# Patient Record
Sex: MALE | Race: WHITE
[De-identification: names, ages, dates, MRNs, and addresses within clinical notes are randomized per-mention and may not be internally consistent; named-entity substitution may affect disease eponyms.]

---

## 2018-06-23 ENCOUNTER — HOSPITAL ENCOUNTER (INPATIENT)
Dept: HOSPITAL 62 - ER | Age: 73
LOS: 2 days | Discharge: HOME | DRG: 203 | End: 2018-06-25
Attending: INTERNAL MEDICINE | Admitting: INTERNAL MEDICINE
Payer: MEDICARE

## 2018-06-23 DIAGNOSIS — R09.02: ICD-10-CM

## 2018-06-23 DIAGNOSIS — E78.00: ICD-10-CM

## 2018-06-23 DIAGNOSIS — Z79.01: ICD-10-CM

## 2018-06-23 DIAGNOSIS — I48.91: ICD-10-CM

## 2018-06-23 DIAGNOSIS — I10: ICD-10-CM

## 2018-06-23 DIAGNOSIS — J45.21: Primary | ICD-10-CM

## 2018-06-23 LAB
ADD MANUAL DIFF: NO
ANION GAP SERPL CALC-SCNC: 12 MMOL/L (ref 5–19)
BASOPHILS # BLD AUTO: 0.1 10^3/UL (ref 0–0.2)
BASOPHILS NFR BLD AUTO: 0.6 % (ref 0–2)
BUN SERPL-MCNC: 15 MG/DL (ref 7–20)
CALCIUM: 10 MG/DL (ref 8.4–10.2)
CHLORIDE SERPL-SCNC: 106 MMOL/L (ref 98–107)
CO2 SERPL-SCNC: 25 MMOL/L (ref 22–30)
EOSINOPHIL # BLD AUTO: 0.6 10^3/UL (ref 0–0.6)
EOSINOPHIL NFR BLD AUTO: 5.6 % (ref 0–6)
ERYTHROCYTE [DISTWIDTH] IN BLOOD BY AUTOMATED COUNT: 14.7 % (ref 11.5–14)
GLUCOSE SERPL-MCNC: 116 MG/DL (ref 75–110)
HCT VFR BLD CALC: 45.3 % (ref 37.9–51)
HGB BLD-MCNC: 15.3 G/DL (ref 13.5–17)
LYMPHOCYTES # BLD AUTO: 0.9 10^3/UL (ref 0.5–4.7)
LYMPHOCYTES NFR BLD AUTO: 8.4 % (ref 13–45)
MCH RBC QN AUTO: 28.1 PG (ref 27–33.4)
MCHC RBC AUTO-ENTMCNC: 33.8 G/DL (ref 32–36)
MCV RBC AUTO: 83 FL (ref 80–97)
MONOCYTES # BLD AUTO: 0.9 10^3/UL (ref 0.1–1.4)
MONOCYTES NFR BLD AUTO: 7.7 % (ref 3–13)
NEUTROPHILS # BLD AUTO: 8.7 10^3/UL (ref 1.7–8.2)
NEUTS SEG NFR BLD AUTO: 77.7 % (ref 42–78)
PLATELET # BLD: 208 10^3/UL (ref 150–450)
POTASSIUM SERPL-SCNC: 4.3 MMOL/L (ref 3.6–5)
RBC # BLD AUTO: 5.45 10^6/UL (ref 4.35–5.55)
SODIUM SERPL-SCNC: 142.7 MMOL/L (ref 137–145)
TOTAL CELLS COUNTED % (AUTO): 100 %
WBC # BLD AUTO: 11.2 10^3/UL (ref 4–10.5)

## 2018-06-23 PROCEDURE — 87070 CULTURE OTHR SPECIMN AEROBIC: CPT

## 2018-06-23 PROCEDURE — 87040 BLOOD CULTURE FOR BACTERIA: CPT

## 2018-06-23 PROCEDURE — 85025 COMPLETE CBC W/AUTO DIFF WBC: CPT

## 2018-06-23 PROCEDURE — 87205 SMEAR GRAM STAIN: CPT

## 2018-06-23 PROCEDURE — 83880 ASSAY OF NATRIURETIC PEPTIDE: CPT

## 2018-06-23 PROCEDURE — 94799 UNLISTED PULMONARY SVC/PX: CPT

## 2018-06-23 PROCEDURE — 84484 ASSAY OF TROPONIN QUANT: CPT

## 2018-06-23 PROCEDURE — 94640 AIRWAY INHALATION TREATMENT: CPT

## 2018-06-23 PROCEDURE — 71045 X-RAY EXAM CHEST 1 VIEW: CPT

## 2018-06-23 PROCEDURE — 94667 MNPJ CHEST WALL 1ST: CPT

## 2018-06-23 PROCEDURE — 96367 TX/PROPH/DG ADDL SEQ IV INF: CPT

## 2018-06-23 PROCEDURE — 94668 MNPJ CHEST WALL SBSQ: CPT

## 2018-06-23 PROCEDURE — 96366 THER/PROPH/DIAG IV INF ADDON: CPT

## 2018-06-23 PROCEDURE — 96365 THER/PROPH/DIAG IV INF INIT: CPT

## 2018-06-23 PROCEDURE — 93010 ELECTROCARDIOGRAM REPORT: CPT

## 2018-06-23 PROCEDURE — 99285 EMERGENCY DEPT VISIT HI MDM: CPT

## 2018-06-23 PROCEDURE — 93005 ELECTROCARDIOGRAM TRACING: CPT

## 2018-06-23 PROCEDURE — 36415 COLL VENOUS BLD VENIPUNCTURE: CPT

## 2018-06-23 PROCEDURE — 80048 BASIC METABOLIC PNL TOTAL CA: CPT

## 2018-06-23 RX ADMIN — FLUTICASONE PROPIONATE SCH SPRAY: 50 SPRAY, METERED NASAL at 22:00

## 2018-06-23 RX ADMIN — IPRATROPIUM BROMIDE AND ALBUTEROL SULFATE SCH ML: 2.5; .5 SOLUTION RESPIRATORY (INHALATION) at 20:12

## 2018-06-23 RX ADMIN — ALBUTEROL SULFATE SCH MG: 2.5 SOLUTION RESPIRATORY (INHALATION) at 03:39

## 2018-06-23 RX ADMIN — IPRATROPIUM BROMIDE AND ALBUTEROL SULFATE SCH ML: 2.5; .5 SOLUTION RESPIRATORY (INHALATION) at 14:12

## 2018-06-23 RX ADMIN — IPRATROPIUM BROMIDE AND ALBUTEROL SULFATE SCH ML: 2.5; .5 SOLUTION RESPIRATORY (INHALATION) at 08:50

## 2018-06-23 RX ADMIN — Medication SCH ML: at 06:38

## 2018-06-23 RX ADMIN — ALBUTEROL SULFATE PRN MG: 2.5 SOLUTION RESPIRATORY (INHALATION) at 18:07

## 2018-06-23 RX ADMIN — BUDESONIDE AND FORMOTEROL FUMARATE DIHYDRATE SCH PUFF: 160; 4.5 AEROSOL RESPIRATORY (INHALATION) at 22:00

## 2018-06-23 RX ADMIN — METOPROLOL TARTRATE SCH MG: 25 TABLET, FILM COATED ORAL at 22:01

## 2018-06-23 RX ADMIN — RIVAROXABAN SCH MG: 10 TABLET, FILM COATED ORAL at 22:01

## 2018-06-23 RX ADMIN — ALBUTEROL SULFATE SCH MG: 2.5 SOLUTION RESPIRATORY (INHALATION) at 03:11

## 2018-06-23 RX ADMIN — Medication SCH ML: at 22:00

## 2018-06-23 RX ADMIN — IPRATROPIUM BROMIDE AND ALBUTEROL SULFATE PRN ML: 2.5; .5 SOLUTION RESPIRATORY (INHALATION) at 07:28

## 2018-06-23 RX ADMIN — Medication SCH ML: at 12:56

## 2018-06-23 RX ADMIN — IPRATROPIUM BROMIDE AND ALBUTEROL SULFATE PRN ML: 2.5; .5 SOLUTION RESPIRATORY (INHALATION) at 10:15

## 2018-06-23 NOTE — EKG REPORT
SEVERITY:- ABNORMAL ECG -

SINUS RHYTHM

VENTRICULAR PREMATURE COMPLEX

LEFT ANTERIOR FASCICULAR BLOCK

PROBABLE LEFT VENTRICULAR HYPERTROPHY

:

Confirmed by: Roman Steven 23-Jun-2018 10:02:14

## 2018-06-23 NOTE — PROGRESS NOTE
Provider Note


Provider Note: 





Patient seen and examined on rounds this morning.  Please see full assessment 

and plan on HPI this morning. 





Patient states he is feeling much better since he came in.  Patient was using 

his nebulizer treatment I went to see him.  At this time he had already 

received azithromycin and Rocephin IV antibiotics in the ED.  I will stop his 

Rocephin and continue the azithromycin.  He also received 1 dose of Solu-Medrol 

125 mg IV today.  I will start him on Solu-Medrol 60 mg q. 8 from tomorrow.  We 

can titrate down to p.o. prednisone on day of discharge.  At this time we will 

start with aggressive bronchial hygiene, PEP therapy, DuoNeb every 6 hours and 

albuterol every 2 hours as needed.  We will also provide with incentive 

spirometry and start his home dose of Symbicort.  Since he is having productive 

sputum we will get a sputum culture.





Patient is already asking when he can go home and wondering if he can go home 

tomorrow night.  I told him that we will see how he is doing tonight and 

tomorrow and reevaluate his status.

## 2018-06-23 NOTE — PDOC H&P
History of Present Illness


Admission Date/PCP: 


  06/23/18 06:30





  NO LOCALMD





Patient complains of: Shortness of breath


History of Present Illness: 


GEETA CASTILLO is a 72 year old male with a past medical history of atrial 

fibrillation on anticoagulation and asthma.  Patient presents after 1 week of 

exacerbation of asthma not improved following treatment of acute bronchitis 

with azithromycin.  He denies chest pain, palpitations, fever chills nausea or 

vomiting.  He admits worsening after exposure to hot and humid air a known 

trigger for his asthma.  He also admits rhinorrhea and mild sore throat but 

denies acid reflux.  In the emergency room is found to be hypoxic of 88 on room 

air, global wheeze and a peak flow of only 175.  He receives multiple albuterol 

treatments and Solu-Medrol with minimal improvement referred to the hospitalist 

for admission.  He denies previous intubation





Past Medical History


Cardiac Medical History: Reports: Atrial Fibrillation, Hyperlipidema, 

Hypertension


   Denies: Coronary Artery Disease, Myocardial Infarction


Pulmonary Medical History: 


   Denies: Asthma, Bronchitis, Chronic Obstructive Pulmonary Disease (COPD), 

Pneumonia


Neurological Medical History: 


   Denies: Seizures


Musculoskeltal Medical History: 


   Denies: Arthritis


Hematology: 


   Denies: Anemia





Past Surgical History


Past Surgical History: 


   Denies: Pacemaker





Social History


Information Source: Patient


Lives with: Spouse/Significant other


Smoking Status: Unknown if Ever Smoked


Drugs: None





- Advance Directive


Resuscitation Status: Full Code





Family History


Family History: Hypertension


Parental Family History Reviewed: Yes


Children Family History Reviewed: Yes


Sibling(s) Family History Reviewed.: Yes





Medication/Allergy


Home Medications: 








Lisinopril/Hydrochlorothiazide [Zestoretic 10-12.5 mg Tablet] 10 mg PO DAILY 04/ 11/12 


Simvastatin [Zocor 40 mg Tablet] 40 mg PO DAILY 04/11/12 








Allergies/Adverse Reactions: 


 





No Known Allergies Allergy (Unverified 04/10/12 14:49)


 











Review of Systems


Constitutional: ABSENT: chills, fever(s), headache(s), weight gain, weight loss


Eyes: ABSENT: visual disturbances


Ears: ABSENT: hearing changes


Cardiovascular: ABSENT: chest pain, dyspnea on exertion, edema, orthropnea, 

palpitations


Respiratory: ABSENT: cough, hemoptysis


Gastrointestinal: ABSENT: abdominal pain, constipation, diarrhea, hematemesis, 

hematochezia, nausea, vomiting


Genitourinary: ABSENT: dysuria, hematuria


Musculoskeletal: ABSENT: joint swelling


Integumentary: ABSENT: rash, wounds


Neurological: ABSENT: abnormal gait, abnormal speech, confusion, dizziness, 

focal weakness, syncope


Psychiatric: ABSENT: anxiety, depression, homidical ideation, suicidal ideation


Endocrine: ABSENT: cold intolerance, heat intolerance, polydipsia, polyuria


Hematologic/Lymphatic: ABSENT: easy bleeding, easy bruising





Physical Exam


Vital Signs: 


 











Temp Pulse Resp BP Pulse Ox


 


 98.4 F   96   24 H  143/90 H  96 


 


 06/23/18 02:26  06/23/18 02:26  06/23/18 02:26  06/23/18 05:01  06/23/18 06:00











General appearance: PRESENT: cooperative, severe distress.  ABSENT: no acute 

distress, obese


Head exam: PRESENT: atraumatic, normocephalic


Eye exam: PRESENT: conjunctiva pink, EOMI, PERRLA.  ABSENT: scleral icterus


Ear exam: PRESENT: normal external ear exam


Mouth exam: PRESENT: moist, tongue midline


Neck exam: ABSENT: carotid bruit, JVD, lymphadenopathy, thyromegaly


Respiratory exam: PRESENT: accessory muscle use, prolonged expiratory phas, 

rales, retraction, symmetrical, tachypnea, wheezes.  ABSENT: rhonchi, stridor


Cardiovascular exam: PRESENT: RRR


Pulses: PRESENT: normal dorsalis pedis pul


Vascular exam: PRESENT: normal capillary refill


GI/Abdominal exam: PRESENT: normal bowel sounds, soft.  ABSENT: distended, 

guarding, mass, organolmegaly, rebound, tenderness


Rectal exam: PRESENT: deferred


Extremities exam: PRESENT: full ROM.  ABSENT: calf tenderness, clubbing, pedal 

edema


Neurological exam: PRESENT: alert, awake, oriented to person, oriented to place

, oriented to time, oriented to situation, CN II-XII grossly intact.  ABSENT: 

motor sensory deficit


Psychiatric exam: PRESENT: appropriate affect, normal mood.  ABSENT: homicidal 

ideation, suicidal ideation


Skin exam: PRESENT: dry, intact, warm.  ABSENT: cyanosis, rash





Results


Impressions: 


 





Chest X-Ray  06/23/18 02:28


IMPRESSION:


 


No acute cardiopulmonary findings.


 














Assessment & Plan





- Diagnosis


(1) Asthma exacerbation


Is this a current diagnosis for this admission?: Yes   


Plan: 


Following exposure to known trigger.  Peak flow of only 175.  Solu-Medrol, 

albuterol, Flonase, chlorpheniramine, supplemental oxygen








(2) Bronchitis


Is this a current diagnosis for this admission?: Yes   


Plan: 


Empiric antibiotics as increased severity would likely require intubation.,








(3) A-fib


Is this a current diagnosis for this admission?: Yes   


Plan: 


Appears paroxysmal, continue outpatient rate limiting med unless beta-blocker I 

will convert to calcium channel blocker during his acute exacerbation of 

bronchospasm.  Possible contributing factor will evaluate proBNP and continue 

telemetry monitoring








(4) Hypoxemia requiring supplemental oxygen


Is this a current diagnosis for this admission?: Yes   


Plan: 


Secondary to #1








- Time


Time Spent: 50 to 70 Minutes





- Inpatient Certification


Medical Necessity: Need Close Monitoring Due to Risk of Patient Decompensation

## 2018-06-23 NOTE — ER DOCUMENT REPORT
ED General





- General


Chief Complaint: Shortness Of Breath


Stated Complaint: COUGH


Time Seen by Provider: 06/23/18 03:00


TRAVEL OUTSIDE OF THE [place] IN LAST 30 DAYS: No





- HPI


Patient complains to provider of: Shortness of breath


Notes: 





Patient coming in today for shortness of breath.  Patient has a history asthma.

  Patient states worsening over the last 4-5 days followed up with his 

physician was given breathing treatments and albuterol however states he was 

not given any antibiotics.  Patient states productive sputum yellow no fevers 

no chills no recent travel no chest pain.  Patient states this normally happens 

once a year and is cleared up with antibiotics.  Patient is tachypneic speaking 

in approximately 7 word sentences





- Related Data


Allergies/Adverse Reactions: 


 





No Known Allergies Allergy (Unverified 04/10/12 14:49)


 











Past Medical History





- Social History


Smoking Status: Unknown if Ever Smoked


Family History: Reviewed & Not Pertinent


Patient has suicidal ideation: No


Patient has homicidal ideation: No





- Past Medical History


Cardiac Medical History: Reports: Hx Hypercholesterolemia, Hx Hypertension


   Denies: Hx Coronary Artery Disease, Hx Heart Attack


Pulmonary Medical History: 


   Denies: Hx Asthma, Hx Bronchitis, Hx COPD, Hx Pneumonia


Neurological Medical History: Denies: Hx Cerebrovascular Accident, Hx Seizures


Renal/ Medical History: Denies: Hx Peritoneal Dialysis


Musculoskeltal Medical History: Denies Hx Arthritis


Past Surgical History: Denies: Hx Pacemaker





- Immunizations


Hx Diphtheria, Pertussis, Tetanus Vaccination: Yes





Review of Systems





- Review of Systems


Constitutional: No symptoms reported


EENT: No symptoms reported


Cardiovascular: No symptoms reported


Respiratory: Cough, Short of breath


Gastrointestinal: No symptoms reported


Genitourinary: No symptoms reported


Male Genitourinary: No symptoms reported


Musculoskeletal: No symptoms reported


Skin: No symptoms reported


Hematologic/Lymphatic: No symptoms reported


Neurological/Psychological: No symptoms reported


-: Yes All other systems reviewed and negative





Physical Exam





- Vital signs


Vitals: 





 











Temp Pulse Resp BP Pulse Ox


 


 98.4 F   96   24 H  162/87 H  93 


 


 06/23/18 02:26  06/23/18 02:26  06/23/18 02:26  06/23/18 02:26  06/23/18 02:26











Interpretation: Tachypneic


Notes: 





Pulse ox in triage in the lower 90s 80% with patient moving around placed on 2 

L of oxygen





- General


General appearance: Appears well, Alert





- HEENT


Head: Normocephalic, Atraumatic


Eyes: Normal


Pupils: PERRL





- Respiratory


Respiratory status: Tachypnea


Chest status: Nontender


Breath sounds: Rhonchi, Wheezing


Chest palpation: Normal





- Cardiovascular


Rhythm: Regular


Heart sounds: Normal auscultation


Murmur: No





- Abdominal


Inspection: Normal


Distension: No distension


Bowel sounds: Normal


Tenderness: Nontender


Organomegaly: No organomegaly





- Back


Back: Normal, Nontender





- Extremities


General upper extremity: Normal inspection, Nontender, Normal color, Normal ROM

, Normal temperature


General lower extremity: Normal inspection, Nontender, Normal color, Normal ROM

, Normal temperature, Normal weight bearing.  No: Ronaldo's sign





- Neurological


Neuro grossly intact: Yes


Cognition: Normal


Orientation: AAOx4


[place] Coma Scale Eye Opening: Spontaneous


[place] Coma Scale Verbal: Oriented


[place] Coma Scale Motor: Obeys Commands


[place] Coma Scale Total: 15


Speech: Normal


Motor strength normal: LUE, RUE, LLE, RLE


Sensory: Normal





- Psychological


Associated symptoms: Normal affect, Normal mood





- Skin


Skin Temperature: Warm


Skin Moisture: Dry


Skin Color: Normal





Course





- Re-evaluation


Re-evalutation: 





06/23/18 06:22


Patient with minimal improvement with bronchodilator therapy steroids and 

magnesium.  Chest x-ray did not show any pneumonia laboratory studies not show 

any significant pathology therefore patient with minimal improvement will be 

admitted to the hospital service for further evaluation.





- Vital Signs


Vital signs: 





 











Temp Pulse Resp BP Pulse Ox


 


 98.4 F   96   24 H  143/78 H  94 


 


 06/23/18 02:26  06/23/18 02:26  06/23/18 02:26  06/23/18 04:01  06/23/18 04:01














- Laboratory


Result Diagrams: 


 06/23/18 03:41





 06/23/18 03:41


Laboratory results interpreted by me: 





 











  06/23/18 06/23/18





  03:41 03:41


 


WBC  11.2 H 


 


RDW  14.7 H 


 


Lymphocytes %  8.4 L 


 


Absolute Neutrophils  8.7 H 


 


Glucose   116 H














Discharge





- Discharge


Clinical Impression: 


 Asthma exacerbation, Bronchitis, Hypoxemia requiring supplemental oxygen





Condition: Good


Disposition: ADMITTED AS INPATIENT


Admitting Provider: Hospitalist [facility]


Unit Admitted: IMC[facility]


Referrals: 


LOCALMD,NO [Primary Care Provider] - Follow up as needed

## 2018-06-23 NOTE — RADIOLOGY REPORT (SQ)
EXAM DESCRIPTION:

XR CHEST 1 VIEW



COMPLETED DATE/TME:  06/23/2018 02:28



CLINICAL HISTORY:

72 years Male, breathing difficulty



COMPARISON:

None.



NUMBER OF VIEWS/TECHNIQUE:

1/AP



FINDINGS:



Adequate lung volume, clear parenchyma, normal cardiac

silhouette, and intact bony thorax.



IMPRESSION:



No acute cardiopulmonary findings.

## 2018-06-24 LAB
ABSOLUTE LYMPHOCYTES# (MANUAL): 0.2 10^3/UL (ref 0.5–4.7)
ABSOLUTE MONOCYTES # (MANUAL): 1.6 10^3/UL (ref 0.1–1.4)
ABSOLUTE NEUTROPHILS# (MANUAL): 14.1 10^3/UL (ref 1.7–8.2)
ADD MANUAL DIFF: YES
ANION GAP SERPL CALC-SCNC: 11 MMOL/L (ref 5–19)
BASOPHILS NFR BLD MANUAL: 0 % (ref 0–2)
BUN SERPL-MCNC: 27 MG/DL (ref 7–20)
CALCIUM: 9.5 MG/DL (ref 8.4–10.2)
CHLORIDE SERPL-SCNC: 103 MMOL/L (ref 98–107)
CO2 SERPL-SCNC: 28 MMOL/L (ref 22–30)
EOSINOPHIL NFR BLD MANUAL: 0 % (ref 0–6)
ERYTHROCYTE [DISTWIDTH] IN BLOOD BY AUTOMATED COUNT: 14.6 % (ref 11.5–14)
GLUCOSE SERPL-MCNC: 127 MG/DL (ref 75–110)
HCT VFR BLD CALC: 41.1 % (ref 37.9–51)
HGB BLD-MCNC: 13.9 G/DL (ref 13.5–17)
MCH RBC QN AUTO: 27.9 PG (ref 27–33.4)
MCHC RBC AUTO-ENTMCNC: 33.9 G/DL (ref 32–36)
MCV RBC AUTO: 82 FL (ref 80–97)
MONOCYTES % (MANUAL): 10 % (ref 3–13)
PLATELET # BLD: 235 10^3/UL (ref 150–450)
PLATELET COMMENT: ADEQUATE
POTASSIUM SERPL-SCNC: 5.1 MMOL/L (ref 3.6–5)
RBC # BLD AUTO: 4.98 10^6/UL (ref 4.35–5.55)
RBC MORPH BLD: (no result)
SEGMENTED NEUTROPHILS % (MAN): 89 % (ref 42–78)
SODIUM SERPL-SCNC: 141.7 MMOL/L (ref 137–145)
TOTAL CELLS COUNTED BLD: 100
VARIANT LYMPHS NFR BLD MANUAL: 1 % (ref 13–45)
WBC # BLD AUTO: 15.8 10^3/UL (ref 4–10.5)

## 2018-06-24 RX ADMIN — ALBUTEROL SULFATE PRN MG: 2.5 SOLUTION RESPIRATORY (INHALATION) at 06:47

## 2018-06-24 RX ADMIN — AZITHROMYCIN MONOHYDRATE SCH MG: 500 INJECTION, POWDER, LYOPHILIZED, FOR SOLUTION INTRAVENOUS at 07:55

## 2018-06-24 RX ADMIN — IPRATROPIUM BROMIDE AND ALBUTEROL SULFATE SCH ML: 2.5; .5 SOLUTION RESPIRATORY (INHALATION) at 20:25

## 2018-06-24 RX ADMIN — Medication SCH ML: at 06:45

## 2018-06-24 RX ADMIN — IPRATROPIUM BROMIDE AND ALBUTEROL SULFATE PRN ML: 2.5; .5 SOLUTION RESPIRATORY (INHALATION) at 00:24

## 2018-06-24 RX ADMIN — IPRATROPIUM BROMIDE AND ALBUTEROL SULFATE SCH ML: 2.5; .5 SOLUTION RESPIRATORY (INHALATION) at 04:05

## 2018-06-24 RX ADMIN — METHYLPREDNISOLONE SODIUM SUCCINATE SCH MG: 125 INJECTION, POWDER, FOR SOLUTION INTRAMUSCULAR; INTRAVENOUS at 13:23

## 2018-06-24 RX ADMIN — BUDESONIDE AND FORMOTEROL FUMARATE DIHYDRATE SCH PUFF: 160; 4.5 AEROSOL RESPIRATORY (INHALATION) at 22:26

## 2018-06-24 RX ADMIN — IPRATROPIUM BROMIDE AND ALBUTEROL SULFATE SCH ML: 2.5; .5 SOLUTION RESPIRATORY (INHALATION) at 08:40

## 2018-06-24 RX ADMIN — FLUTICASONE PROPIONATE SCH SPRAY: 50 SPRAY, METERED NASAL at 09:46

## 2018-06-24 RX ADMIN — Medication SCH ML: at 13:23

## 2018-06-24 RX ADMIN — Medication SCH ML: at 22:39

## 2018-06-24 RX ADMIN — METOPROLOL TARTRATE SCH MG: 25 TABLET, FILM COATED ORAL at 22:27

## 2018-06-24 RX ADMIN — RIVAROXABAN SCH MG: 10 TABLET, FILM COATED ORAL at 22:26

## 2018-06-24 RX ADMIN — METHYLPREDNISOLONE SODIUM SUCCINATE SCH MG: 125 INJECTION, POWDER, FOR SOLUTION INTRAMUSCULAR; INTRAVENOUS at 06:44

## 2018-06-24 RX ADMIN — METOPROLOL TARTRATE SCH MG: 25 TABLET, FILM COATED ORAL at 09:48

## 2018-06-24 RX ADMIN — BUDESONIDE AND FORMOTEROL FUMARATE DIHYDRATE SCH PUFF: 160; 4.5 AEROSOL RESPIRATORY (INHALATION) at 09:48

## 2018-06-24 RX ADMIN — FLUTICASONE PROPIONATE SCH SPRAY: 50 SPRAY, METERED NASAL at 22:39

## 2018-06-24 RX ADMIN — IPRATROPIUM BROMIDE AND ALBUTEROL SULFATE SCH ML: 2.5; .5 SOLUTION RESPIRATORY (INHALATION) at 14:09

## 2018-06-24 NOTE — PDOC PROGRESS REPORT
Subjective


Progress Note for:: 06/24/18 - seen during morning rounds


Subjective:: 





states he feels much better than yesterday. although he didnt get his eggs this 

morning and hence didnt eat his breakfast


Reason For Visit: 


COPD EXACERBATION PNEUMONIA








Physical Exam


Vital Signs: 


 











Temp Pulse Resp BP Pulse Ox


 


 97.3 F   76   18   147/86 H  95 


 


 06/24/18 15:13  06/24/18 15:13  06/24/18 15:13  06/24/18 15:13  06/24/18 16:06








 Intake & Output











 06/23/18 06/24/18 06/25/18





 06:59 06:59 06:59


 


Intake Total  1536 237


 


Balance  1536 237


 


Weight  156 lb 11.979 oz 











General appearance: PRESENT: no acute distress, cooperative


Head exam: PRESENT: atraumatic, normocephalic


Eye exam: PRESENT: EOMI.  ABSENT: scleral icterus


Ear exam: PRESENT: normal external ear exam


Mouth exam: PRESENT: neck supple, tongue midline


Neck exam: ABSENT: tracheal deviation


Respiratory exam: PRESENT: decreased breath sounds, symmetrical, wheezes, other 

- decreased BS at the bases with scattered exipratory wheezing in bilateral 

lung fields


Cardiovascular exam: PRESENT: +S1, +S2


Pulses: PRESENT: +2 pedal pulses bilateral


GI/Abdominal exam: PRESENT: soft.  ABSENT: tenderness


Extremities exam: ABSENT: joint swelling, pedal edema, tenderness


Musculoskeletal exam: PRESENT: full ROM


Neurological exam: PRESENT: alert, awake, CN II-XII grossly intact


Skin exam: PRESENT: dry, warm





Results


Laboratory Results: 


 





 06/24/18 04:54 





 06/24/18 04:54 





 











  06/24/18 06/24/18





  04:54 04:54


 


WBC  15.8 H 


 


RBC  4.98 


 


Hgb  13.9 


 


Hct  41.1 


 


MCV  82 


 


MCH  27.9 


 


MCHC  33.9 


 


RDW  14.6 H 


 


Plt Count  235 


 


Seg Neutrophils %  Not Reportable 


 


Lymphocytes %  Not Reportable 


 


Monocytes %  Not Reportable 


 


Eosinophils %  Not Reportable 


 


Basophils %  Not Reportable 


 


Absolute Neutrophils  Not Reportable 


 


Absolute Lymphocytes  Not Reportable 


 


Absolute Monocytes  Not Reportable 


 


Absolute Eosinophils  Not Reportable 


 


Absolute Basophils  Not Reportable 


 


Sodium   141.7


 


Potassium   5.1 H


 


Chloride   103


 


Carbon Dioxide   28


 


Anion Gap   11


 


BUN   27 H


 


Creatinine   1.30 H


 


Est GFR ( Amer)   > 60


 


Est GFR (Non-Af Amer)   54 L


 


Glucose   127 H


 


Calcium   9.5











Impressions: 


 





Chest X-Ray  06/23/18 02:28


IMPRESSION:


 


No acute cardiopulmonary findings.


 














Assessment & Plan





- Diagnosis


(1) Asthma exacerbation


Qualifiers: 


   Asthma severity: unspecified severity   Asthma persistence: intermittent   

Qualified Code(s): J45.21 - Mild intermittent asthma with (acute) exacerbation 

  


Is this a current diagnosis for this admission?: Yes   


Plan: 


Started back on his Symbicort, started on duoneb Q6h and prn albuterol.  also 

added azithromycin for exacerbation 








(2) Hypoxemia requiring supplemental oxygen


Is this a current diagnosis for this admission?: Yes   


Plan: 


likely 2/2 asthma exac- see plan above








(3) A-fib


Is this a current diagnosis for this admission?: Yes   


Plan: 


c/w metoprolol and xarelto- not in afib now








- Plan Summary


Plan Summary: 





likely d/c in AM if he continues to improve

## 2018-06-25 VITALS — DIASTOLIC BLOOD PRESSURE: 87 MMHG | SYSTOLIC BLOOD PRESSURE: 154 MMHG

## 2018-06-25 RX ADMIN — BUDESONIDE AND FORMOTEROL FUMARATE DIHYDRATE SCH PUFF: 160; 4.5 AEROSOL RESPIRATORY (INHALATION) at 09:03

## 2018-06-25 RX ADMIN — Medication SCH ML: at 07:00

## 2018-06-25 RX ADMIN — IPRATROPIUM BROMIDE AND ALBUTEROL SULFATE SCH ML: 2.5; .5 SOLUTION RESPIRATORY (INHALATION) at 01:53

## 2018-06-25 RX ADMIN — METOPROLOL TARTRATE SCH MG: 25 TABLET, FILM COATED ORAL at 09:13

## 2018-06-25 RX ADMIN — AZITHROMYCIN MONOHYDRATE SCH MG: 500 INJECTION, POWDER, LYOPHILIZED, FOR SOLUTION INTRAVENOUS at 09:04

## 2018-06-25 RX ADMIN — FLUTICASONE PROPIONATE SCH SPRAY: 50 SPRAY, METERED NASAL at 09:14

## 2018-06-25 RX ADMIN — IPRATROPIUM BROMIDE AND ALBUTEROL SULFATE SCH ML: 2.5; .5 SOLUTION RESPIRATORY (INHALATION) at 08:27

## 2018-06-25 NOTE — PDOC DISCHARGE SUMMARY
General





- Admit/Disc Date/PCP


Admission Date/Primary Care Provider: 


  06/23/18 06:30





  NO LOCALMD





Discharge Date: 06/25/18 - Discharge early this morning.





- Discharge Diagnosis


(1) Asthma exacerbation


Is this a current diagnosis for this admission?: Yes   





(2) Hypoxemia requiring supplemental oxygen


Is this a current diagnosis for this admission?: Yes   





(3) A-fib


Is this a current diagnosis for this admission?: Yes   





- Additional Information


Resuscitation Status: Full Code


Discharge Diet: As Tolerated


Discharge Activity: Activity As Tolerated


Prescriptions: 


Azithromycin 250 mg PO DAILY #3 tablet


Prednisone [Deltasone 20 mg Tablet] See Protocol PO DAILY #12 tablet


Home Medications: 








Budesonide/Formoterol Fumarate [Symbicort 160-4.5 Mcg Inhaler] 2 puff IH BID 06/ 23/18 


Ipratropium/Albuterol Sulfate [Duoneb 3 ml Ampul] 3 ml NEB RTQ6 06/23/18 


Metoprolol Tartrate [Lopressor 25 mg Tablet] 25 mg PO Q12 06/23/18 


Rivaroxaban [Xarelto] 20 mg PO QHS 06/23/18 


Acetaminophen [Tylenol 325 mg Tablet] 650 mg PO Q4HP PRN  tablet 06/25/18 


Azithromycin 250 mg PO DAILY #3 tablet 06/25/18 


Fluticasone Propionate [Flonase Nasal Spray 50 Mcg/Spray 16 gm] 2 spray NASL 

Q12  spray.pump 06/25/18 


Prednisone [Deltasone 20 mg Tablet] See Protocol PO DAILY #12 tablet 06/25/18 











History of Present Illness


History of Present Illness: 


GEETA CASTILLO is a 72 year old male who was admitted to the hospital for 

acute asthma exacerbation.  Please see initial H&P for full details








Hospital Course


Hospital Course: 





I had the pleasure of taking care of this patient during his acute illness.  We 

started him on bronchial hygiene, Pap therapy and DuoNeb for his shortness of 

breath.  He was also started on IV antibiotics and continue on azithromycin 

next day.  After 2 days of aggressive therapy he had improved significantly and 

was requesting discharge today.  I talked to him about possibly another 24 

hours of therapy but he states that he will go home to continue therapy on his 

own.  At this time we will discharge him on IV azithromycin and a prednisone 

taper.  He is aware of his asthma exacerbation and signs and symptoms when to 

call his primary care doctor.  I advised him to follow-up with his PCP in 1 

week and possibly pulmonologist if needed.  I had a long discussion with him 

about how to avoid exacerbations in the future.





Physical Exam


Vital Signs: 


 











Temp Pulse Resp BP Pulse Ox


 


 97.6 F   71   18   140/77 H  97 


 


 06/25/18 08:42  06/25/18 08:42  06/25/18 08:42  06/25/18 08:42  06/25/18 08:42








 Intake & Output











 06/24/18 06/25/18 06/26/18





 06:59 06:59 06:59


 


Intake Total 1536 2026 


 


Balance 1536 2026 


 


Weight 156 lb 11.979 oz 159 lb 2.78 oz 











General appearance: PRESENT: no acute distress, cooperative


Head exam: PRESENT: atraumatic, normocephalic


Eye exam: PRESENT: EOMI.  ABSENT: scleral icterus


Ear exam: PRESENT: normal external ear exam


Mouth exam: PRESENT: moist, neck supple


Neck exam: ABSENT: tenderness, tracheal deviation


Respiratory exam: PRESENT: decreased breath sounds - Mostly at the bases with 

occasional expiratory wheezes, symmetrical, wheezes


Cardiovascular exam: PRESENT: +S1, +S2


Pulses: PRESENT: +2 pedal pulses bilateral


GI/Abdominal exam: PRESENT: normal bowel sounds, soft.  ABSENT: tenderness


Extremities exam: ABSENT: joint swelling, pedal edema


Musculoskeletal exam: PRESENT: full ROM.  ABSENT: tenderness


Skin exam: PRESENT: dry, warm





Results


Laboratory Results: 


 





 06/24/18 04:54 





 06/24/18 04:54 








Impressions: 


 





Chest X-Ray  06/23/18 02:28


IMPRESSION:


 


No acute cardiopulmonary findings.


 














Qualifiers





- *


PATIENT BEING DISCHARGED WITH ANY OF THE FOLLOWING DIAGNOSIS: No


VTE patient discharged on overlapping Therapy?: No





Plan


Discharge Plan: 





Continue with antibiotics for another few days.  Continue with prednisone taper 

as discussed and directed.  Follow-up with the PCP within 1 week.


Time Spent: Less than 30 Minutes